# Patient Record
Sex: MALE | Race: BLACK OR AFRICAN AMERICAN | URBAN - METROPOLITAN AREA
[De-identification: names, ages, dates, MRNs, and addresses within clinical notes are randomized per-mention and may not be internally consistent; named-entity substitution may affect disease eponyms.]

---

## 2018-06-29 ENCOUNTER — EXTERNAL NURSING HOME DOCUMENTATION (OUTPATIENT)
Dept: INTERNAL MEDICINE CLINIC | Age: 78
End: 2018-06-29

## 2018-06-29 DIAGNOSIS — I71.40 ABDOMINAL AORTIC ANEURYSM (AAA) WITHOUT RUPTURE: Primary | ICD-10-CM

## 2018-06-29 DIAGNOSIS — J44.9 CHRONIC OBSTRUCTIVE PULMONARY DISEASE, UNSPECIFIED COPD TYPE (HCC): ICD-10-CM

## 2018-06-29 DIAGNOSIS — Z86.79 S/P AAA REPAIR: ICD-10-CM

## 2018-06-29 DIAGNOSIS — I10 ESSENTIAL HYPERTENSION: ICD-10-CM

## 2018-06-29 DIAGNOSIS — I25.10 CORONARY ARTERY DISEASE INVOLVING NATIVE HEART WITHOUT ANGINA PECTORIS, UNSPECIFIED VESSEL OR LESION TYPE: ICD-10-CM

## 2018-06-29 DIAGNOSIS — R26.9 GAIT ABNORMALITY: ICD-10-CM

## 2018-06-29 DIAGNOSIS — Z98.890 S/P AAA REPAIR: ICD-10-CM

## 2018-07-01 RX ORDER — TRIAMCINOLONE ACETONIDE 1 MG/G
OINTMENT TOPICAL 2 TIMES DAILY
Qty: 30 G | Refills: 0 | Status: SHIPPED | OUTPATIENT
Start: 2018-07-01

## 2018-07-01 RX ORDER — LORATADINE 10 MG/1
10 TABLET ORAL DAILY
Qty: 30 TAB | Refills: 0 | Status: SHIPPED | OUTPATIENT
Start: 2018-07-01

## 2018-07-01 RX ORDER — ATORVASTATIN CALCIUM 40 MG/1
40 TABLET, FILM COATED ORAL DAILY
Qty: 30 TAB | Refills: 0 | Status: SHIPPED | OUTPATIENT
Start: 2018-07-01

## 2018-07-01 RX ORDER — HYDROCORTISONE 25 MG/G
CREAM TOPICAL 2 TIMES DAILY
Qty: 30 G | Refills: 0 | Status: SHIPPED | OUTPATIENT
Start: 2018-07-01

## 2018-07-01 RX ORDER — CARVEDILOL 25 MG/1
25 TABLET ORAL 2 TIMES DAILY WITH MEALS
Qty: 60 TAB | Refills: 0 | Status: SHIPPED | OUTPATIENT
Start: 2018-07-01

## 2018-07-01 RX ORDER — OXYBUTYNIN CHLORIDE 5 MG/1
5 TABLET ORAL 3 TIMES DAILY
Qty: 90 TAB | Refills: 0 | Status: SHIPPED | OUTPATIENT
Start: 2018-07-01

## 2018-07-01 RX ORDER — PHENOL/SODIUM PHENOLATE
20 AEROSOL, SPRAY (ML) MUCOUS MEMBRANE DAILY
Qty: 30 TAB | Refills: 0 | Status: SHIPPED | OUTPATIENT
Start: 2018-07-01

## 2018-07-01 RX ORDER — AMLODIPINE BESYLATE 10 MG/1
10 TABLET ORAL DAILY
Qty: 30 TAB | Refills: 0 | Status: SHIPPED | OUTPATIENT
Start: 2018-07-01

## 2018-07-01 RX ORDER — FLUTICASONE PROPIONATE AND SALMETEROL 250; 50 UG/1; UG/1
1 POWDER RESPIRATORY (INHALATION) EVERY 12 HOURS
Qty: 1 INHALER | Refills: 0 | Status: SHIPPED | OUTPATIENT
Start: 2018-07-01

## 2018-07-01 RX ORDER — ALBUTEROL SULFATE 90 UG/1
2 AEROSOL, METERED RESPIRATORY (INHALATION)
Qty: 1 INHALER | Refills: 0 | Status: SHIPPED | OUTPATIENT
Start: 2018-07-01

## 2018-07-01 RX ORDER — VALSARTAN 320 MG/1
320 TABLET ORAL DAILY
Qty: 30 TAB | Refills: 0 | Status: SHIPPED | OUTPATIENT
Start: 2018-07-01

## 2018-07-05 NOTE — PROGRESS NOTES
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 He was admitted to [facility] with elective endovascular aneurysm repair (EVAR) and placement of left renal artery, EVAR placement, and also placement of a left renal artery stent, and right iliac artery stent. The patient had undergone the procedure, and postoperatively the patient is doing well. He has tolerated the procedure well. No discomfort. The patients medications were readjusted, and the patient was transferred to [facility] for further physical therapy and occupation therapy. I am seeing him here in the rehabilitation. The patient is doing well. The patient was admitted earlier but nobody notified that she is my patient. So, it was late when I have seen him first.  So, he is doing well in the rehabilitation and has no discomfort. Past Medical History:  Significant for CVA, hypertension, BPH, hyperlipidemia, bilateral iliac artery aneurysm, and distal abdominal aortic aneurysm, carotid artery stenosis, gastroesophageal reflux disease (GERD), chronic kidney disease (CKD), obesity, COPD. Past Surgical History:  Cataract removal, endarterectomy right-sided carotid endarterectomy.       Medications:  He is on:  Albuterol, Ventolin HFA 2 puffs every 4 hours as needed, amlodipine 10 mg every day, aspirin 81 mg once daily, Lipitor 40 mg once daily, Tylenol 650 mg every 4 hours as needed, Colace 25 mg b.i.d., Advair 250/50 at 1 puff b.i.d., hydrocortisone 2.5% lotion b.i.d., loratadine 10 mg once daily, multivitamin once every day, omeprazole 20 mg once daily, oxybutynin 5 mg t.i.d., Incruse Ellipta 1 inhalation every once daily, valsartan 323 mg once daily. Allergies:  No known drug allergies. Social History:  The patient is a former smoker, quit 1 month back. He drinks beer and alcohol occasionally. He lives at home with his family. Family History:  Noncontributory. Review of Systems:  A complete review of systems done which right now is essentially negative. Physical Examination:  GENERAL:  The patient is a pleasant  male, not in any distress. VITAL SIGNS:  Temperature 98.7 degrees Fahrenheit, pulse 82 per minute, blood pressure 134/87 mmHg, SaO2 at 98% on room air. Weight 186 pounds. HEENT:  No abnormality detected. NECK:  Supple. No JVD. No carotid bruits. No thyromegaly. CHEST:  Clear to auscultation bilaterally. No rales. No rhonchi. CARDIOVASCULAR:  S1, S2 normal.  Regular rate and rhythm. ABDOMEN:  Soft, nontender, and nondistended. Bowel sounds present. EXTREMITIES:  No edema noted. Dorsalis pedis pulse normal.  NEUROLOGICAL:  Alert and oriented x3. Cranial nerves 2 through 12 grossly intact. Motor is 4/5 on the right side, 5/5 on the left side. Sensory is within normal limits. Assessment and Plan:    1. Abdominal aortic aneurysm along with bilateral iliac artery aneurysm. The patient underwent bilateral aortic skin graft known as endovascular aneurysm repair (EVAR) along with the patient also underwent iliac artery stent placement. He has tolerated the procedure well. He is getting physical therapy and occupational therapy in rehabilitation. We will this for now. We will continue amlodipine, aspirin, Coreg, and valsartan for his blood pressure. 2. Hypertension and coronary artery disease. He is takin aspirin, Coreg, and statin Lipitor 40 mg once daily and amlodipine for coronary artery disease.   He is doing well. 3. Ex-smoker. Possible chronic obstructive pulmonary disease (COPD). The patient is using albuterol inhaler, His breathing is okay. 4. History of cerebrovascular accident (CVA). He is on aspirin and statin. He is doing well. 5. Chronic kidney disease (CKD). We will monitor his creatinine; he is at baseline. 6. Gait weakness. He will continue physical therapy and occupational therapy. THIS IS NOT A COMPLETE MEDICAL RECORD ON THIS PATIENT.    THIS IS A PATIENT AT [facility].    PLEASE CONTACT THE FACILITY LISTED BELOW FOR MORE INFORMATION ON THIS PATIENT.    THE COMPLETE RECORD RESIDES WITH THIS LONG TERM CARE FACILITY       MD Tal Cho/magan; D: 06/29/2018; T: 06/30/2018; Job#: [id number]